# Patient Record
Sex: FEMALE | Race: ASIAN | NOT HISPANIC OR LATINO
[De-identification: names, ages, dates, MRNs, and addresses within clinical notes are randomized per-mention and may not be internally consistent; named-entity substitution may affect disease eponyms.]

---

## 2019-07-28 ENCOUNTER — RESULT REVIEW (OUTPATIENT)
Age: 54
End: 2019-07-28

## 2023-05-05 ENCOUNTER — APPOINTMENT (OUTPATIENT)
Dept: FAMILY MEDICINE | Facility: CLINIC | Age: 58
End: 2023-05-05

## 2024-03-01 ENCOUNTER — NON-APPOINTMENT (OUTPATIENT)
Age: 59
End: 2024-03-01

## 2024-03-04 ENCOUNTER — APPOINTMENT (OUTPATIENT)
Dept: ORTHOPEDIC SURGERY | Facility: CLINIC | Age: 59
End: 2024-03-04
Payer: COMMERCIAL

## 2024-03-04 VITALS
DIASTOLIC BLOOD PRESSURE: 90 MMHG | HEIGHT: 61 IN | HEART RATE: 87 BPM | WEIGHT: 128 LBS | SYSTOLIC BLOOD PRESSURE: 149 MMHG | BODY MASS INDEX: 24.17 KG/M2

## 2024-03-04 DIAGNOSIS — M25.562 PAIN IN LEFT KNEE: ICD-10-CM

## 2024-03-04 DIAGNOSIS — M17.12 UNILATERAL PRIMARY OSTEOARTHRITIS, LEFT KNEE: ICD-10-CM

## 2024-03-04 DIAGNOSIS — Z86.79 PERSONAL HISTORY OF OTHER DISEASES OF THE CIRCULATORY SYSTEM: ICD-10-CM

## 2024-03-04 DIAGNOSIS — Z78.9 OTHER SPECIFIED HEALTH STATUS: ICD-10-CM

## 2024-03-04 PROBLEM — Z00.00 ENCOUNTER FOR PREVENTIVE HEALTH EXAMINATION: Status: ACTIVE | Noted: 2024-03-04

## 2024-03-04 PROCEDURE — 99203 OFFICE O/P NEW LOW 30 MIN: CPT

## 2024-03-04 PROCEDURE — 73564 X-RAY EXAM KNEE 4 OR MORE: CPT | Mod: LT

## 2024-03-04 RX ORDER — LEVOTHYROXINE SODIUM 137 UG/1
TABLET ORAL
Refills: 0 | Status: ACTIVE | COMMUNITY

## 2024-03-04 RX ORDER — LOSARTAN POTASSIUM 100 MG/1
TABLET, FILM COATED ORAL
Refills: 0 | Status: ACTIVE | COMMUNITY

## 2024-03-04 NOTE — DISCUSSION/SUMMARY
[de-identified] : The patient has osteoarthritis of both knees.  Her left knee is symptomatic at this time.  I have discussed the pathology, natural history and treatment options with her.  She will take ibuprofen as needed.  She is started on a quadricep strengthening program.  If not improved in 1 month she will consider physical therapy or prescription medication.

## 2024-03-04 NOTE — PHYSICAL EXAM
[LE] : Sensory: Intact in bilateral lower extremities [DP] : dorsalis pedis 2+ and symmetric bilaterally [PT] : posterior tibial 2+ and symmetric bilaterally [Normal] : Alert and in no acute distress [Poor Appearance] : well-appearing [Acute Distress] : not in acute distress [Obese] : not obese [de-identified] : The patient has no respiratory distress. Mood and affect are normal. The patient is alert and oriented to person, place and time. There is no pain with active or passive motion of the hips.  There is no tenderness of either hip.  Examination of the knees demonstrates mild anteromedial tenderness on the left.  There is no tenderness of the right knee.  Quadriceps and hamstring function are intact.  There is no instability of collateral or cruciate ligaments.  Range of motion 0 to 120 degrees bilaterally.  The calves are soft and nontender.  The skin is intact.  There is no lymphedema. [de-identified] : AP, lateral, tunnel and sunrise x-rays of the left knee taken today demonstrate no fracture or dislocation.  She has moderate tricompartmental degenerative changes.  The single AP view of the right knee also demonstrates osteoarthritis.

## 2024-03-04 NOTE — HISTORY OF PRESENT ILLNESS
[de-identified] : 48-year-old female presents for initial evaluation of left knee pain x 1 month. Denies trauma or injury. She complains of intermittent aching and dull pain in the knee worse with full extension of the knee, kneeling, descending stairs and prolonged walking. She has been taking Ibuprofen with good relief.  GOAL: Pt will ambulate 150ft independently in 4 weeks.